# Patient Record
Sex: MALE | Race: WHITE | NOT HISPANIC OR LATINO | URBAN - NONMETROPOLITAN AREA
[De-identification: names, ages, dates, MRNs, and addresses within clinical notes are randomized per-mention and may not be internally consistent; named-entity substitution may affect disease eponyms.]

---

## 2018-02-19 DIAGNOSIS — G47.00 INSOMNIA, UNSPECIFIED TYPE: ICD-10-CM

## 2018-02-22 DIAGNOSIS — G47.00 INSOMNIA, UNSPECIFIED TYPE: ICD-10-CM

## 2018-02-22 RX ORDER — ZOLPIDEM TARTRATE 10 MG/1
TABLET ORAL
Qty: 90 TABLET | Refills: 0 | Status: SHIPPED | OUTPATIENT
Start: 2018-02-22 | End: 2018-02-22 | Stop reason: SDUPTHER

## 2018-02-23 RX ORDER — ZOLPIDEM TARTRATE 10 MG/1
TABLET ORAL
Qty: 90 TABLET | Refills: 0 | Status: SHIPPED | OUTPATIENT
Start: 2018-02-23 | End: 2018-05-16 | Stop reason: SDUPTHER

## 2018-02-23 RX ORDER — ZOLPIDEM TARTRATE 10 MG/1
TABLET ORAL
OUTPATIENT
Start: 2018-02-23

## 2018-03-06 DIAGNOSIS — L71.9 ROSACEA: Primary | ICD-10-CM

## 2018-03-06 RX ORDER — AZELAIC ACID 0.15 G/G
1 GEL TOPICAL DAILY
Qty: 50 G | Refills: 2 | Status: SHIPPED | OUTPATIENT
Start: 2018-03-06 | End: 2019-03-06

## 2018-05-10 ENCOUNTER — OFFICE VISIT (OUTPATIENT)
Dept: FAMILY MEDICINE | Facility: CLINIC | Age: 78
End: 2018-05-10
Payer: COMMERCIAL

## 2018-05-10 VITALS
BODY MASS INDEX: 29.44 KG/M2 | SYSTOLIC BLOOD PRESSURE: 142 MMHG | TEMPERATURE: 99 F | WEIGHT: 188 LBS | RESPIRATION RATE: 18 BRPM | DIASTOLIC BLOOD PRESSURE: 80 MMHG | HEART RATE: 83 BPM | OXYGEN SATURATION: 93 %

## 2018-05-10 DIAGNOSIS — J40 BRONCHITIS: Primary | ICD-10-CM

## 2018-05-10 PROBLEM — N40.0 BENIGN PROSTATIC HYPERPLASIA: Status: ACTIVE | Noted: 2018-05-10

## 2018-05-10 PROBLEM — J84.10: Status: ACTIVE | Noted: 2018-05-10

## 2018-05-10 PROCEDURE — 99213 OFFICE O/P EST LOW 20 MIN: CPT | Mod: PO | Performed by: FAMILY MEDICINE

## 2018-05-10 PROCEDURE — 99213 OFFICE O/P EST LOW 20 MIN: CPT | Performed by: FAMILY MEDICINE

## 2018-05-10 RX ORDER — AZITHROMYCIN 250 MG/1
TABLET, FILM COATED ORAL
Qty: 6 TABLET | Refills: 0 | Status: SHIPPED | OUTPATIENT
Start: 2018-05-10 | End: 2018-05-16 | Stop reason: WASHOUT

## 2018-05-10 RX ORDER — PREDNISONE 10 MG/1
TABLET ORAL
Qty: 20 TABLET | Refills: 0 | Status: SHIPPED | OUTPATIENT
Start: 2018-05-10 | End: 2018-05-16 | Stop reason: WASHOUT

## 2018-05-10 ASSESSMENT — ENCOUNTER SYMPTOMS
CHEST TIGHTNESS: 0
ABDOMINAL PAIN: 0
HEMATURIA: 0
WHEEZING: 0
CHILLS: 0
DYSURIA: 0
TROUBLE SWALLOWING: 0
DIAPHORESIS: 0
DIARRHEA: 0
DIZZINESS: 0
DYSPHORIC MOOD: 0
WEAKNESS: 0
SLEEP DISTURBANCE: 0
COUGH: 0
SHORTNESS OF BREATH: 0
MYALGIAS: 0
BRUISES/BLEEDS EASILY: 1
FATIGUE: 0
CONSTIPATION: 0
EYE DISCHARGE: 0
UNEXPECTED WEIGHT CHANGE: 0
POLYDIPSIA: 0
FREQUENCY: 1
SINUS PRESSURE: 0
SORE THROAT: 0
NUMBNESS: 0
BACK PAIN: 1
FEVER: 0
HEADACHES: 0
BLOOD IN STOOL: 0
ADENOPATHY: 0
EYE PAIN: 0
EYE REDNESS: 0
DIFFICULTY URINATING: 0
NERVOUS/ANXIOUS: 0
VOICE CHANGE: 0
VOMITING: 0
RHINORRHEA: 0
NAUSEA: 0
ARTHRALGIAS: 0
JOINT SWELLING: 0
PALPITATIONS: 0
APPETITE CHANGE: 0
WOUND: 0

## 2018-05-10 ASSESSMENT — PAIN SCALES - GENERAL: PAINLEVEL: 4

## 2018-05-10 NOTE — PROGRESS NOTES
Subjective      Manuel Lazcano is a 77 y.o. male who presents for Cough (Heavy congestion onset of symptoms started 3 weeks ago.) and Shortness of Breath (Normally walks 2-4 miles/day. Unable )      Patient presents for cough, shortness of breath, and congestion for 3 weeks. Cough is occasionally productive. Patient states that he came back from [place] and states there was a lot of high winds picking up allergens and dust causing him sinus congestion/drainage which then moved into his chest. Patient states he typically walks 2-5 miles per day but since he has become sick he is unable to due to his shortness of breath. Denies fevers or chills but his wife did notice he was having some night sweats lately. Cough worsens when he lays down. He denies any PND or orthopnea.    Patient has previously had pulmonary testing done in [place] with concerns of interstitial pulmonary fibrosis. He received a 2nd opinion that stated he did not Have pulmonary fibrosis.  He has not had any significant issue since then.. No further concerns.           Allergies   Allergen Reactions   • Penicillins Hives     HIVES       Review of Systems   Constitutional: Negative for appetite change, chills, diaphoresis, fatigue, fever and unexpected weight change.   HENT: Negative for congestion, dental problem, ear discharge, ear pain, hearing loss, mouth sores, nosebleeds, rhinorrhea, sinus pressure, sore throat, tinnitus, trouble swallowing and voice change.    Eyes: Negative for pain, discharge, redness and visual disturbance.   Respiratory: Negative for cough, chest tightness, shortness of breath and wheezing.    Cardiovascular: Negative for chest pain, palpitations and leg swelling.   Gastrointestinal: Negative for abdominal pain, blood in stool, constipation, diarrhea, nausea and vomiting.   Endocrine: Negative for cold intolerance, heat intolerance, polydipsia and polyuria.   Genitourinary: Positive for frequency. Negative for  difficulty urinating, dysuria and hematuria.   Musculoskeletal: Positive for back pain. Negative for arthralgias, joint swelling and myalgias.   Skin: Negative for rash and wound.   Allergic/Immunologic: Positive for environmental allergies.   Neurological: Negative for dizziness, weakness, numbness and headaches.   Hematological: Negative for adenopathy. Bruises/bleeds easily.   Psychiatric/Behavioral: Negative for behavioral problems, dysphoric mood and sleep disturbance. The patient is not nervous/anxious.        Objective     Vitals  /80 (BP Location: Right arm, Patient Position: Sitting, Cuff Size: Reg)   Pulse 83   Temp 37.2 °C (99 °F) (Temporal)   Resp 18   Wt 85.3 kg (188 lb)   SpO2 93%   BMI 29.44 kg/m²     Physical Exam   Constitutional: He appears well-developed and well-nourished. No distress.   HENT:   Right Ear: External ear normal.   Left Ear: External ear normal.   Nose: Nose normal.   Mouth/Throat: Posterior oropharyngeal erythema present. No oropharyngeal exudate.   Eyes: Conjunctivae are normal. Right eye exhibits no discharge. Left eye exhibits no discharge. No scleral icterus.   Neck: Neck supple.   Cardiovascular: Normal rate, regular rhythm and normal heart sounds.  Exam reveals no gallop and no friction rub.    No murmur heard.  Pulmonary/Chest: Effort normal. No respiratory distress. He has no wheezes. He has no rales.   Diffuse rhonchi.    Musculoskeletal: He exhibits no edema.   Lymphadenopathy:     He has no cervical adenopathy.   Skin: Skin is warm and dry. No rash noted. He is not diaphoretic.   Nursing note and vitals reviewed.      Assessment/Plan   Diagnoses and all orders for this visit:    Bronchitis  Comments:  Start course of azithromycin and prednisone. Return precautions given, f/u if symptoms persist or worsen.  Orders:  -     azithromycin (ZITHROMAX) 250 mg tablet; Take 2 tablets the first day, then 1 tablet daily for 4 days.  -     predniSONE (DELTASONE) 10 mg  tablet; Take 4 tablets by mouth daily for 5 days        Return if symptoms worsen or fail to improve.    JORDY CABALLERO MD

## 2018-05-11 PROBLEM — M10.9 GOUT: Status: ACTIVE | Noted: 2018-05-11

## 2018-05-15 ENCOUNTER — TELEPHONE (OUTPATIENT)
Dept: FAMILY MEDICINE | Facility: CLINIC | Age: 78
End: 2018-05-15

## 2018-05-15 NOTE — TELEPHONE ENCOUNTER
If he is still not feeling well would recommend he come back and see us  sometime this week.  Thanks

## 2018-05-15 NOTE — TELEPHONE ENCOUNTER
Called pt he finished antibiotic and prednisone yesterday ,feels he is 20-30% better , chest still feels tight and has productive cough unknown color. He is out walking at this time , once he gets back home he will have walked about 1 mile. Did not sound winded on the phone. No temp. Appetite good

## 2018-05-15 NOTE — TELEPHONE ENCOUNTER
Called pt with recommendations per Dr. Rowe, transferred to scheduling, for an appointment this week , pt agreed

## 2018-05-16 ENCOUNTER — OFFICE VISIT (OUTPATIENT)
Dept: FAMILY MEDICINE | Facility: CLINIC | Age: 78
End: 2018-05-16
Payer: COMMERCIAL

## 2018-05-16 VITALS
OXYGEN SATURATION: 94 % | RESPIRATION RATE: 20 BRPM | DIASTOLIC BLOOD PRESSURE: 72 MMHG | BODY MASS INDEX: 29.29 KG/M2 | HEART RATE: 94 BPM | TEMPERATURE: 98.3 F | SYSTOLIC BLOOD PRESSURE: 130 MMHG | WEIGHT: 187 LBS

## 2018-05-16 DIAGNOSIS — G47.00 INSOMNIA, UNSPECIFIED TYPE: ICD-10-CM

## 2018-05-16 DIAGNOSIS — J40 BRONCHITIS: ICD-10-CM

## 2018-05-16 PROCEDURE — 99213 OFFICE O/P EST LOW 20 MIN: CPT | Performed by: FAMILY MEDICINE

## 2018-05-16 PROCEDURE — 99213 OFFICE O/P EST LOW 20 MIN: CPT | Mod: PO | Performed by: FAMILY MEDICINE

## 2018-05-16 RX ORDER — PREDNISONE 10 MG/1
TABLET ORAL
Qty: 20 TABLET | Refills: 0 | Status: SHIPPED | OUTPATIENT
Start: 2018-05-16 | End: 2024-09-07 | Stop reason: ALTCHOICE

## 2018-05-16 RX ORDER — ZOLPIDEM TARTRATE 10 MG/1
10 TABLET ORAL NIGHTLY
Qty: 90 TABLET | Refills: 3 | Status: SHIPPED | OUTPATIENT
Start: 2018-05-16 | End: 2018-05-18 | Stop reason: SDUPTHER

## 2018-05-16 ASSESSMENT — PAIN SCALES - GENERAL: PAINLEVEL: 0-NO PAIN

## 2018-05-16 NOTE — PROGRESS NOTES
Subjective      Manuel Lazcano is a 77 y.o. male who presents for Cough (Still present.) and Shortness of Breath (Improved but not 100% yet. )      Patient presents for follow up regarding bronchitis. He is improving but is still unable to take a full deep breath without coughing. Cough continues to be occasionally productive with white sputum, sore throat due to cough, and some tightness in chest. Denies fevers, chills, swelling in legs. He has finished courses of azithromycin and prednisone. He was able to walk 1 mile yesterday but had to rest on a bench towards the end to catch his breath.      Patient states he had a similar bronchitis episode such as this 2 years ago that had to be treated with 2 rounds of antibiotics.     Patient is requesting a refill on zolpidem. He uses this nightly so he is able to fall back to sleep after having to get up to use the restroom due to hx of benign prostatic hyperplasia. Patient is aware of risks of this medication, especially in elderly.  He states his sleep is very affected if he is not on the medication and is willing to accept risks.          Allergies   Allergen Reactions   • Penicillins Hives     HIVES       Review of Systems    Objective     Vitals  /72 (BP Location: Left arm, Patient Position: Sitting, Cuff Size: Reg)   Pulse 94   Temp 36.8 °C (98.3 °F) (Temporal)   Resp 20   Wt 84.8 kg (187 lb)   SpO2 94%   BMI 29.29 kg/m²     Physical Exam   Constitutional: He appears well-developed and well-nourished. No distress.   HENT:   Right Ear: External ear normal.   Left Ear: External ear normal.   Nose: Nose normal.   Mouth/Throat: Posterior oropharyngeal erythema present. No oropharyngeal exudate.   Eyes: Conjunctivae are normal. Right eye exhibits no discharge. Left eye exhibits no discharge. No scleral icterus.   Neck: Neck supple.   Cardiovascular: Normal rate, regular rhythm and normal heart sounds.  Exam reveals no gallop and no friction rub.    No  murmur heard.  Pulmonary/Chest: Effort normal. No respiratory distress. He has no wheezes. He has no rales.   Diffuse mild rhonchi.   Musculoskeletal: He exhibits no edema.   Lymphadenopathy:     He has no cervical adenopathy.   Skin: Skin is warm and dry. No rash noted. He is not diaphoretic.   Nursing note and vitals reviewed.      Assessment/Plan   Diagnoses and all orders for this visit:    Bronchitis  Comments:  Improving. Continue course of prednisone for 5 more days. F/u by phone call next week as needed.Return precautions given.  Orders:  -     predniSONE (DELTASONE) 10 mg tablet; Take 4 tablets by mouth daily for 5 days    Insomnia, unspecified type  Comments:  Refill Ambien.  Potential risks discussed.  Patient will follow-up as needed.  Orders:  -     zolpidem (AMBIEN) 10 mg tablet; Take 1 tablet (10 mg total) by mouth nightly.        Return if symptoms worsen or fail to improve.    JORDY CABALLERO MD

## 2018-05-17 DIAGNOSIS — J30.9 ALLERGIC RHINITIS, UNSPECIFIED CHRONICITY, UNSPECIFIED SEASONALITY, UNSPECIFIED TRIGGER: Primary | ICD-10-CM

## 2018-05-18 ENCOUNTER — TELEPHONE (OUTPATIENT)
Dept: FAMILY MEDICINE | Facility: CLINIC | Age: 78
End: 2018-05-18

## 2018-05-18 DIAGNOSIS — G47.00 INSOMNIA, UNSPECIFIED TYPE: ICD-10-CM

## 2018-05-18 RX ORDER — MONTELUKAST SODIUM 10 MG/1
TABLET ORAL
Qty: 90 TABLET | Refills: 2 | Status: SHIPPED | OUTPATIENT
Start: 2018-05-18 | End: 2018-12-02 | Stop reason: SDUPTHER

## 2018-05-18 RX ORDER — ZOLPIDEM TARTRATE 10 MG/1
10 TABLET ORAL NIGHTLY
Qty: 90 TABLET | Refills: 1 | OUTPATIENT
Start: 2018-05-18 | End: 2018-11-23 | Stop reason: SDUPTHER

## 2018-05-18 NOTE — TELEPHONE ENCOUNTER
"Cancelled rx zolpidem that was sent to [facility] in [facility] in [place], [place] on 5/16/18.    Per office notes from visit on 5/16/18, \"Patient is requesting a refill on zolpidem. He uses this nightly so he is able to fall back to sleep after having to get up to use the restroom due to hx of benign prostatic hyperplasia. Patient is aware of risks of this medication, especially in elderly.  He states his sleep is very affected if he is not on the medication and is willing to accept risks.\"    Rx was called to [facility] in [facility] in [place]; left on prescriber voicemail.      "

## 2018-05-21 ENCOUNTER — OFFICE VISIT (OUTPATIENT)
Dept: DERMATOLOGY | Facility: CLINIC | Age: 78
End: 2018-05-21
Attending: DERMATOLOGY
Payer: COMMERCIAL

## 2018-05-21 VITALS
HEIGHT: 67 IN | SYSTOLIC BLOOD PRESSURE: 153 MMHG | DIASTOLIC BLOOD PRESSURE: 73 MMHG | WEIGHT: 186.95 LBS | BODY MASS INDEX: 29.34 KG/M2

## 2018-05-21 DIAGNOSIS — D48.9 NEOPLASM OF UNCERTAIN BEHAVIOR: ICD-10-CM

## 2018-05-21 DIAGNOSIS — L82.0 INFLAMED SEBORRHEIC KERATOSIS: ICD-10-CM

## 2018-05-21 DIAGNOSIS — L90.5 SCAR OF SKIN: ICD-10-CM

## 2018-05-21 DIAGNOSIS — L57.0 KERATOSIS, ACTINIC: Primary | ICD-10-CM

## 2018-05-21 PROCEDURE — 99213 OFFICE O/P EST LOW 20 MIN: CPT | Mod: 25 | Performed by: DERMATOLOGY

## 2018-05-21 PROCEDURE — 17004 DESTROY PREMAL LESIONS 15/>: CPT | Mod: PO,51 | Performed by: DERMATOLOGY

## 2018-05-21 PROCEDURE — 99212 OFFICE O/P EST SF 10 MIN: CPT | Mod: PO | Performed by: DERMATOLOGY

## 2018-05-21 PROCEDURE — 17110 DESTRUCTION B9 LES UP TO 14: CPT | Mod: 59 | Performed by: DERMATOLOGY

## 2018-05-21 ASSESSMENT — PAIN SCALES - GENERAL: PAINLEVEL: 0-NO PAIN

## 2018-05-21 NOTE — PROGRESS NOTES
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    Destruction of Lesion  Date/Time: 5/21/2018 11:15 AM  Performed by: JET ALMANZAR      Consent  Verbal consent was obtained from the patient. Written consent was not obtained from the patient. Risks include permanent scarring, light or dark discoloration, infection, pain, bleeding, bruising, redness, blister formation and recurrence of the lesion. Consent given by patient. The patient states understanding of the procedure being performed. Patient ID confirmed verbally with patient and provided demographic data.     Location:  23 total lesions removed from head/neck.  Head/neck location(s): R cheek, L cheek, scalp, L ear and R ear  Number of head/neck lesions removed: 23    Procedure Details   The area was prepped and draped in a sterile fashion. Local anesthesia was not used. Lesion(s) are pre-malignant. Lesion(s) destroyed with: liquid nitrogen. Number of freeze/thaw cycles was 1.     Post-Procedure  Antibiotic ointment and sterile dressing was applied. Patient tolerated procedure well with no complications.       Destruction of Lesion  Date/Time: 5/21/2018 11:19 AM  Performed by: JET ALMANZAR      Consent  Verbal consent was obtained from the patient. Written consent was not obtained from the patient. Risks include permanent scarring, light or dark discoloration, infection, pain, bleeding, bruising, redness, blister formation and recurrence of the lesion. Consent given by patient. The patient states understanding of the procedure being performed. Patient ID confirmed verbally with patient and provided demographic data.     Location:  1 total lesions removed from trunk.  Trunk location(s): back  Number of Trunk lesions removed:1    Procedure Details   The area was prepped and draped in a sterile fashion. Local anesthesia was not  used. Lesion(s) are benign. Lesion(s) destroyed with: liquid nitrogen. Lesion(s) were frozen until an ice ball extended beyond the lesion.     Post-Procedure  Patient tolerated procedure well with no complications.

## 2018-05-21 NOTE — PROGRESS NOTES
HPI     The patient presents today for his annual skin check.  He has new lesions of concern on the face ears and scalp.  He recently underwent Mohs micrographic surgery to the right frontal scalp.  This is well-healed and he has no complaints at present.        Patient problems have been reviewed and documented as below:  Patient Active Problem List   Diagnosis   • Benign prostatic hyperplasia   • Gout       Patient's social and family history have been reviewed and documented as below:  Social History     Social History   • Marital status:      Spouse name: N/A   • Number of children: N/A   • Years of education: N/A     Occupational History   • Not on file.     Social History Main Topics   • Smoking status: Never Smoker   • Smokeless tobacco: Never Used   • Alcohol use Not on file      Comment: Wine   • Drug use: Unknown   • Sexual activity: Not on file     Other Topics Concern   • Not on file     Social History Narrative   • No narrative on file     History reviewed. No pertinent family history.    Pertinent positive review of systems are listed below and are otherwise reviewed as negative from HPI:  Review of Systems   All other systems reviewed and are negative.      All allergies have been reviewed and documented as below:  Allergies   Allergen Reactions   • Penicillins Hives     HIVES       All medications have been reviewed and documented as currently taking as below:    Current Outpatient Prescriptions:   •  azelaic acid (FINACEA) 15 % cream, Apply 1 application topically daily. After skin is washed and patted dry, gently massage a thin film of cream into the affected areas daily., Disp: 50 g, Rfl: 2  •  cholecalciferol, vitamin D3, (VITAMIN D3) 2,000 unit tablet, 1 tab Daily po, Disp: , Rfl:   •  ipratropium (ATROVENT) 0.06 % nasal spray, PRN, Disp: 135, Rfl: 0  •  lansoprazole (PREVACID) 30 mg capsule, TAKE 1 CAPSULE TWICE DAILY, Disp: 120, Rfl: 0  •  mometasone (NASONEX) 50 mcg/actuation nasal  "spray, Use 2 sprays nasally twice a day, Disp: 3, Rfl: 12  •  montelukast (SINGULAIR) 10 mg tablet, TAKE 1 TABLET BY MOUTH  DAILY, Disp: 90 tablet, Rfl: 2  •  olopatadine (PATANOL) 0.1 % ophthalmic solution, , Disp: 15, Rfl: 0  •  polyethylene glycol (MIRALAX) 17 gram packet, Take 1 packet as needed by oral route., Disp: , Rfl:   •  predniSONE (DELTASONE) 10 mg tablet, Take 4 tablets by mouth daily for 5 days, Disp: 20 tablet, Rfl: 0  •  probenecid (BENEMID) 500 mg tablet, , Disp: 90, Rfl: 0  •  tamsulosin (FLOMAX) 0.4 mg capsule,extended release 24hr, , Disp: 90, Rfl: 0  •  zolpidem (AMBIEN) 10 mg tablet, Take 1 tablet (10 mg total) by mouth nightly., Disp: 90 tablet, Rfl: 1    Objective     Vital signs have been reviewed and documented as below:  Vitals:    05/21/18 1047   BP: 153/73   BP Location: Right arm   Patient Position: Sitting   Cuff Size: Regular   Weight: 84.8 kg (186 lb 15.2 oz)   Height: 1.702 m (5' 7.01\")       Physical Exam:  ----------------------  Constitutional: General Appearance: healthy-appearing, well-nourished, and well-developed. Level of Distress: NAD. Ambulation: ambulating normally.  Psychiatric: Insight: good judgement. Mental Status: normal mood and affect and active and alert. Orientation: to time, place, and person. Memory: recent memory normal and remote memory normal.  Head: normocephalic and atraumatic.  Eyes: Lids and Conjunctivae: no discharge or pallor and non-injected. Lens: clear. Sclerae: non-icteric.  Neurologic: Gait and Station: normal gait and station. Cranial Nerves: grossly intact. Coordination and Cerebellum: no tremor.  Skin: a cutaneous examination was performed including: Bilateral upper extremities head and neck.  And were found to be within normal limits with the exceptions of the findings listed below:  Skin: Papules on face ears and scalp.  There is a well-healed surgical scar in the right frontal scalp with no evidence of recurrence.  Scaly brown papule on " left lateral neck    Procedures   Procedures  Derm Physical Exam    Assessment and Plan   1. Keratosis, actinic  Cryosurgery performed in the office today.  Photoprotection measures reinforced.  Recommend annual follow-up.  - Destruction of lesion    2.  History of nonmelanoma skin cancer  --There is no clinical evidence of recurrent nonmelanoma skin cancer on examination today.  -Recommend continued self exams  -Continued photoprotection measures reinforced  -Recommend annual follow-up for full skin examinations      3. Inflamed seborrheic keratosis  Cryosurgery performed to a single lesion on the left neck/shoulder today.

## 2018-09-07 DIAGNOSIS — K21.9 GASTROESOPHAGEAL REFLUX DISEASE, ESOPHAGITIS PRESENCE NOT SPECIFIED: ICD-10-CM

## 2018-09-07 DIAGNOSIS — N40.0 BENIGN PROSTATIC HYPERPLASIA, UNSPECIFIED WHETHER LOWER URINARY TRACT SYMPTOMS PRESENT: ICD-10-CM

## 2018-09-07 DIAGNOSIS — M10.9 GOUT, UNSPECIFIED CAUSE, UNSPECIFIED CHRONICITY, UNSPECIFIED SITE: Primary | ICD-10-CM

## 2018-09-12 RX ORDER — LANSOPRAZOLE 30 MG/1
CAPSULE, DELAYED RELEASE ORAL
Qty: 180 CAPSULE | Refills: 3 | Status: SHIPPED | OUTPATIENT
Start: 2018-09-12

## 2018-09-12 RX ORDER — TAMSULOSIN HYDROCHLORIDE 0.4 MG/1
CAPSULE ORAL
Qty: 90 CAPSULE | Refills: 3 | Status: SHIPPED | OUTPATIENT
Start: 2018-09-12

## 2018-09-12 RX ORDER — PROBENECID 500 MG/1
TABLET, FILM COATED ORAL
Qty: 90 TABLET | Refills: 3 | Status: SHIPPED | OUTPATIENT
Start: 2018-09-12

## 2018-11-13 DIAGNOSIS — G47.00 INSOMNIA, UNSPECIFIED TYPE: ICD-10-CM

## 2018-11-16 ENCOUNTER — OFFICE VISIT (OUTPATIENT)
Dept: URBAN - METROPOLITAN AREA CLINIC 71 | Facility: CLINIC | Age: 78
End: 2018-11-16
Payer: MEDICARE

## 2018-11-16 PROCEDURE — 92002 INTRM OPH EXAM NEW PATIENT: CPT | Performed by: OPTOMETRIST

## 2018-11-16 ASSESSMENT — INTRAOCULAR PRESSURE
OD: 11
OS: 11

## 2018-11-16 NOTE — IMPRESSION/PLAN
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 OS worse than OD. Plan: Genteal Gel severe QHS OU. Sleeping mask. Mod-sev QID OU in day. Blink more thoroughly and more frequently.

## 2018-11-16 NOTE — IMPRESSION/PLAN
Impression: Diagnosis: Presence of intraocular lens. Code: Z96.1. s/p Restore OU. Dilated in SD June 2018.  Plan: rtc yearly dfe

## 2018-11-20 RX ORDER — ZOLPIDEM TARTRATE 10 MG/1
10 TABLET ORAL NIGHTLY
OUTPATIENT
Start: 2018-11-20

## 2018-11-21 ENCOUNTER — TELEPHONE (OUTPATIENT)
Dept: FAMILY MEDICINE | Facility: CLINIC | Age: 78
End: 2018-11-21

## 2018-11-21 NOTE — TELEPHONE ENCOUNTER
Call and spoke with Manuel and they are now in [place] and will not be back till @ May. Was in on 5/18/2018 .     Are you willing to refill?    Thank you

## 2018-11-23 ENCOUNTER — TELEPHONE (OUTPATIENT)
Dept: FAMILY MEDICINE | Facility: CLINIC | Age: 78
End: 2018-11-23

## 2018-11-23 DIAGNOSIS — G47.00 INSOMNIA, UNSPECIFIED TYPE: ICD-10-CM

## 2018-11-23 RX ORDER — ZOLPIDEM TARTRATE 10 MG/1
10 TABLET ORAL NIGHTLY
Qty: 90 TABLET | Refills: 0 | OUTPATIENT
Start: 2018-11-23

## 2018-11-23 NOTE — TELEPHONE ENCOUNTER
Talked with Dr.Jason Senior as  is out of office.  Zolpidem 10 mg #90 one tablet at bedtime with no refills called to [facility] at [facility] in [place], [place] at [phone] at 0940.  Dr.Jason Senior KHANG # [id number]. Patient notifed and verbalizes understanding.

## 2018-11-23 NOTE — TELEPHONE ENCOUNTER
Dr.Jason Senior ordered Zolpidem 10 mg #90 one table at bedtime with no refills.  Patient has made an appointment with provider in [place] but it is not until 02/2019.  Advised patient he will need to make an appointment with another provider before the next refills is due.  Patient verbalizes understanding.

## 2018-11-27 ENCOUNTER — TELEPHONE (OUTPATIENT)
Dept: FAMILY MEDICINE | Facility: CLINIC | Age: 78
End: 2018-11-27

## 2018-12-02 DIAGNOSIS — J30.9 ALLERGIC RHINITIS: ICD-10-CM

## 2018-12-05 DIAGNOSIS — J30.9 ALLERGIC RHINITIS: ICD-10-CM

## 2018-12-05 RX ORDER — MONTELUKAST SODIUM 10 MG/1
TABLET ORAL
Qty: 90 TABLET | Refills: 1 | Status: SHIPPED | OUTPATIENT
Start: 2018-12-05

## 2018-12-05 RX ORDER — MONTELUKAST SODIUM 10 MG/1
10 TABLET ORAL
OUTPATIENT
Start: 2018-12-05

## 2018-12-17 ENCOUNTER — OFFICE VISIT (OUTPATIENT)
Dept: URBAN - METROPOLITAN AREA CLINIC 71 | Facility: CLINIC | Age: 78
End: 2018-12-17
Payer: MEDICARE

## 2018-12-17 PROCEDURE — 99213 OFFICE O/P EST LOW 20 MIN: CPT | Performed by: OPTOMETRIST

## 2018-12-17 RX ORDER — NEOMYCIN SULFATE, POLYMYXIN B SULFATE AND DEXAMETHASONE 3.5; 10000; 1 MG/ML; [USP'U]/ML; MG/ML
SUSPENSION OPHTHALMIC
Qty: 1 | Refills: 0 | Status: INACTIVE
Start: 2018-12-17 | End: 2018-12-17

## 2018-12-17 ASSESSMENT — INTRAOCULAR PRESSURE
OD: 15
OS: 13

## 2018-12-17 NOTE — IMPRESSION/PLAN
Impression: Exposure keratoconjunctivitis, bilateral: D72.939. Much improved OU on Genteal Gel Severe QHS and Systane drops during day and sleeping mask. Pt reports last DE 5/2018 in [place]. Plan: Continue Genteal Gel severe QHS OU. Sleeping mask. Systane QID OU in day. Blink more thoroughly and more frequently. DE here 5/2019.

## 2019-05-17 ENCOUNTER — OFFICE VISIT (OUTPATIENT)
Dept: URBAN - METROPOLITAN AREA CLINIC 71 | Facility: CLINIC | Age: 79
End: 2019-05-17
Payer: MEDICARE

## 2019-05-17 DIAGNOSIS — H16.213 EXPOSURE KERATOCONJUNCTIVITIS, BILATERAL: Primary | ICD-10-CM

## 2019-05-17 PROCEDURE — 99214 OFFICE O/P EST MOD 30 MIN: CPT | Performed by: OPTOMETRIST

## 2019-05-17 ASSESSMENT — INTRAOCULAR PRESSURE
OD: 13
OS: 13

## 2019-05-17 NOTE — IMPRESSION/PLAN
Impression: Diagnosis: Presence of intraocular lens. Code: Z96.1. s/p Restore OU.  Plan: rtc yearly dfe

## 2019-05-17 NOTE — IMPRESSION/PLAN
Impression: Exposure keratoconjunctivitis, bilateral: N16.295. Pt using drops in day. Has difficulty with Genteal Severe. Occasionally uses sleep mask. Mild allergy component. Plan: Continue Genteal GEL severe QHS OU. Sleeping mask. Systane QID OU in day. Blink more thoroughly and more frequently.  Gave ketotifen list. Gave info on TranquilEyes by [employer].

## 2021-03-01 ENCOUNTER — OFFICE VISIT (OUTPATIENT)
Dept: URBAN - METROPOLITAN AREA CLINIC 71 | Facility: CLINIC | Age: 81
End: 2021-03-01
Payer: MEDICARE

## 2021-03-01 DIAGNOSIS — H43.813 VITREOUS DEGENERATION, BILATERAL: ICD-10-CM

## 2021-03-01 PROCEDURE — 92014 COMPRE OPH EXAM EST PT 1/>: CPT | Performed by: OPTOMETRIST

## 2021-03-01 ASSESSMENT — INTRAOCULAR PRESSURE
OD: 14
OS: 15

## 2021-03-01 NOTE — IMPRESSION/PLAN
Impression: Vitreous degeneration, bilateral: H43.813.  PVD OU Plan: Continue to monitor with yearly DE

## 2021-03-01 NOTE — IMPRESSION/PLAN
Impression: Diagnosis: Presence of intraocular lens. Code: Z96.1. s/p Restore OU.  Plan: Continue to observe

## 2021-03-01 NOTE — IMPRESSION/PLAN
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 Recommended lubricating ointment QHS OU, as well as possible Tea Tree oil cleanser if needed. Call if new or worsening symptoms.

## 2021-03-01 NOTE — IMPRESSION/PLAN
Impression: Exposure keratoconjunctivitis, bilateral: V87.438. Pt using drops in day. Has difficulty with Genteal Severe. Occasionally uses sleep mask, but has difficulties keeping them on at night. Mild allergy component. Plan: Continue Genteal GEL severe QHS OU, or lubricating ointment spread across the eyelashes. Instructed in how to apply with clean fingers. Sleeping mask. Systane QID OU in day. Blink more thoroughly and more frequently.  Gave ketotifen list.

## 2021-07-23 ENCOUNTER — OFFICE VISIT (OUTPATIENT)
Dept: URBAN - METROPOLITAN AREA CLINIC 71 | Facility: CLINIC | Age: 81
End: 2021-07-23
Payer: MEDICARE

## 2021-07-23 DIAGNOSIS — B88.0 DERMATITIS DUE TO DEMODEX SPECIES: Primary | ICD-10-CM

## 2021-07-23 DIAGNOSIS — Z96.1 PRESENCE OF INTRAOCULAR LENS: ICD-10-CM

## 2021-07-23 DIAGNOSIS — H52.223 REGULAR ASTIGMATISM, BILATERAL: ICD-10-CM

## 2021-07-23 DIAGNOSIS — H16.223 KERATOCONJUNCTIVITIS SICCA, NOT SPECIFIED AS SJÖGREN'S, BILATERAL: ICD-10-CM

## 2021-07-23 PROCEDURE — 99214 OFFICE O/P EST MOD 30 MIN: CPT | Performed by: OPTOMETRIST

## 2021-07-23 ASSESSMENT — INTRAOCULAR PRESSURE
OD: 12
OS: 12

## 2021-07-23 NOTE — IMPRESSION/PLAN
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 Recommended lubricating ointment QHS OU, as well as Oust Demodex cleanser. Call if new or worsening symptoms.

## 2021-07-23 NOTE — IMPRESSION/PLAN
Impression: Diagnosis: Presence of intraocular lens. Code: Z96.1. s/p Restore OU. Plan: Continue to monitor.

## 2021-07-23 NOTE — IMPRESSION/PLAN
Impression: Regular astigmatism, bilateral: H52.223. Plan: A glasses Rx was not generated or given to pt today. Discussed pt returning for refraction for a glasses script to help with his vision complaints.   Okay to schedule refraction at patient's request.

## 2021-09-27 ENCOUNTER — OFFICE VISIT (OUTPATIENT)
Dept: URBAN - METROPOLITAN AREA CLINIC 71 | Facility: CLINIC | Age: 81
End: 2021-09-27
Payer: MEDICARE

## 2021-09-27 DIAGNOSIS — H52.4 PRESBYOPIA: Primary | ICD-10-CM

## 2021-09-27 PROCEDURE — 92012 INTRM OPH EXAM EST PATIENT: CPT | Performed by: OPTOMETRIST

## 2021-09-27 ASSESSMENT — VISUAL ACUITY
OS: 20/25
OD: 20/20

## 2021-09-27 ASSESSMENT — INTRAOCULAR PRESSURE
OS: 10
OD: 9

## 2021-09-27 NOTE — IMPRESSION/PLAN
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 Pt has not yet started the Oust Demodex. Plan: Discussed. Recommended lubricating ointment QHS OU, as well as Oust Demodex cleanser. Call if new or worsening symptoms.

## 2021-09-27 NOTE — IMPRESSION/PLAN
Impression: Presbyopia: H52.4. Bilateral. Plan: A glasses prescription has been discussed and generated. Adaptation period expected. Patient to call with any concerns.

## 2022-05-05 ENCOUNTER — OFFICE VISIT (OUTPATIENT)
Dept: URBAN - METROPOLITAN AREA CLINIC 30 | Facility: CLINIC | Age: 82
End: 2022-05-05
Payer: MEDICARE

## 2022-05-05 DIAGNOSIS — H16.223 KERATOCONJUNCTIVITIS SICCA, NOT SPECIFIED AS SJÖGREN'S, BILATERAL: Primary | ICD-10-CM

## 2022-05-05 DIAGNOSIS — B88.0 DERMATITIS DUE TO DEMODEX SPECIES: ICD-10-CM

## 2022-05-05 DIAGNOSIS — H43.813 VITREOUS DEGENERATION, BILATERAL: ICD-10-CM

## 2022-05-05 DIAGNOSIS — H43.393 OTHER VITREOUS OPACITIES, BILATERAL: ICD-10-CM

## 2022-05-05 DIAGNOSIS — L71.9 ROSACEA: ICD-10-CM

## 2022-05-05 DIAGNOSIS — H00.025 HORDEOLUM INTERNUM LEFT LOWER LID: ICD-10-CM

## 2022-05-05 DIAGNOSIS — H01.001 UNSPECIFIED BLEPARITIS OF RIGHT UPPER EYELID: ICD-10-CM

## 2022-05-05 PROCEDURE — 92134 CPTRZ OPH DX IMG PST SGM RTA: CPT | Performed by: OPTOMETRIST

## 2022-05-05 PROCEDURE — 83861 MICROFLUID ANALY TEARS: CPT | Performed by: OPTOMETRIST

## 2022-05-05 PROCEDURE — 99204 OFFICE O/P NEW MOD 45 MIN: CPT | Performed by: OPTOMETRIST

## 2022-05-05 RX ORDER — NEOMYCIN SULFATE, POLYMYXIN B SULFATE AND DEXAMETHASONE 3.5; 10000; 1 MG/G; [USP'U]/G; MG/G
OINTMENT OPHTHALMIC
Qty: 1 | Refills: 0 | Status: ACTIVE
Start: 2022-05-05

## 2022-05-05 ASSESSMENT — INTRAOCULAR PRESSURE
OD: 14
OS: 13

## 2022-05-05 ASSESSMENT — VISUAL ACUITY
OD: 20/40
OS: 20/30

## 2022-05-05 ASSESSMENT — KERATOMETRY: OS: 43.30

## 2022-05-05 NOTE — IMPRESSION/PLAN
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 Pt has not yet started the Oust Demodex. Plan: Previous hx. Cont lid hygiene.

## 2022-05-05 NOTE — IMPRESSION/PLAN
Impression: Other vitreous opacities, bilateral: H43.393. Plan: No evidence of RD or tear noted. All signs and risks of retinal detachment or tears were discussed in detail. If pt. notices any symptoms discussed, contact office ASAP. Recommend pt. return for normal recall. see mac oct.

## 2022-05-05 NOTE — IMPRESSION/PLAN
Impression: Keratoconjunctivitis sicca, bilateral: R41.043. Plan: AT's qid OU. Recommend O3's. Avoid ceiling fans.

## 2022-05-05 NOTE — IMPRESSION/PLAN
Impression: Hordeolum internum left lower lid: H00.025. Plan: Start WC's qhs OU and  Maxitrol jayda qhs OU- rx'd today- to the lids.

## 2023-02-23 ENCOUNTER — OFFICE VISIT (OUTPATIENT)
Dept: URBAN - METROPOLITAN AREA CLINIC 18 | Facility: CLINIC | Age: 83
End: 2023-02-23
Payer: MEDICARE

## 2023-02-23 DIAGNOSIS — H43.813 VITREOUS DEGENERATION, BILATERAL: Primary | ICD-10-CM

## 2023-02-23 DIAGNOSIS — H16.223 KERATOCONJUNCTIVITIS SICCA, NOT SPECIFIED AS SJÖGREN'S, BILATERAL: ICD-10-CM

## 2023-02-23 DIAGNOSIS — Z96.1 PRESENCE OF INTRAOCULAR LENS: ICD-10-CM

## 2023-02-23 DIAGNOSIS — H52.4 PRESBYOPIA: ICD-10-CM

## 2023-02-23 PROCEDURE — 99213 OFFICE O/P EST LOW 20 MIN: CPT | Performed by: OPTOMETRIST

## 2023-02-23 ASSESSMENT — VISUAL ACUITY
OS: 20/30
OD: 20/30

## 2023-02-23 ASSESSMENT — INTRAOCULAR PRESSURE
OD: 10
OS: 9

## 2023-02-23 NOTE — IMPRESSION/PLAN
Impression: Vitreous degeneration, bilateral: H43.813. Plan: Posterior vitreous detachment accounts for the patient's complaints. There is no evidence of retinal pathology. All signs and risks of retinal detachment and tears were discussed in detail. Patient instructed to call the office immediately if flashes and floaters increase dramatically.

## 2023-07-17 ENCOUNTER — OFFICE VISIT (OUTPATIENT)
Dept: URGENT CARE | Facility: CLINIC | Age: 83
End: 2023-07-17
Payer: COMMERCIAL

## 2023-07-17 VITALS
OXYGEN SATURATION: 98 % | TEMPERATURE: 97 F | WEIGHT: 173.4 LBS | SYSTOLIC BLOOD PRESSURE: 139 MMHG | HEART RATE: 44 BPM | DIASTOLIC BLOOD PRESSURE: 47 MMHG | BODY MASS INDEX: 27.15 KG/M2 | RESPIRATION RATE: 16 BRPM

## 2023-07-17 DIAGNOSIS — S51.812A SKIN TEAR OF LEFT FOREARM WITHOUT COMPLICATION, INITIAL ENCOUNTER: Primary | ICD-10-CM

## 2023-07-17 DIAGNOSIS — S51.811A SKIN TEAR OF RIGHT FOREARM WITHOUT COMPLICATION, INITIAL ENCOUNTER: ICD-10-CM

## 2023-07-17 PROCEDURE — 99212 OFFICE O/P EST SF 10 MIN: CPT

## 2023-07-17 PROCEDURE — G0463 HOSPITAL OUTPT CLINIC VISIT: HCPCS

## 2023-07-17 RX ORDER — CLOPIDOGREL BISULFATE 75 MG/1
75 TABLET ORAL DAILY
COMMUNITY

## 2023-07-17 ASSESSMENT — ENCOUNTER SYMPTOMS: WOUND: 1

## 2023-07-17 NOTE — PROGRESS NOTES
Subjective      Manuel Lazcano is a 82 y.o. male who presents for skin tears.    HPI    82 year old male presents for skin abrasions that happened yesterday.  [place] leaning against a golf cart.  He tripped and fell.  His arms brushed against the cement.  Took a top layer of skin off.  He went to [employer] and bought a wound care kit and has been using this.  He also reports that he has had a low heart rate and is not having any symptoms of it.  Denies shortness of breath, chest pain, dizziness, etc.    The following have been reviewed and updated as appropriate in this visit:          Allergies   Allergen Reactions    Penicillins Hives     HIVES     Current Outpatient Medications   Medication Sig Dispense Refill    clopidogreL (PLAVIX) 75 mg tablet Take 1 tablet (75 mg total) by mouth daily      montelukast (SINGULAIR) 10 mg tablet TAKE 1 TABLET BY MOUTH  DAILY 90 tablet 1    probenecid (BENEMID) 500 mg tablet TAKE 1 TABLET BY MOUTH  DAILY 90 tablet 3    lansoprazole (PREVACID) 30 mg capsule TAKE 1 CAPSULE BY MOUTH  TWICE DAILY 180 capsule 3    tamsulosin (FLOMAX) 0.4 mg capsule TAKE 1 CAPSULE BY MOUTH  DAILY 90 capsule 3    polyethylene glycol (MIRALAX) 17 gram packet Take 1 packet as needed by oral route.      zolpidem (AMBIEN) 10 mg tablet Take 1 tablet (10 mg total) by mouth nightly. (Patient not taking: Reported on 7/17/2023) 90 tablet 0    predniSONE (DELTASONE) 10 mg tablet Take 4 tablets by mouth daily for 5 days (Patient not taking: Reported on 7/17/2023) 20 tablet 0    mometasone (NASONEX) 50 mcg/actuation nasal spray Use 2 sprays nasally twice a day 3 12    ipratropium (ATROVENT) 0.06 % nasal spray  0    olopatadine (PATANOL) 0.1 % ophthalmic solution  15 0    cholecalciferol, vitamin D3, (VITAMIN D3) 2,000 unit tablet 1 tab Daily po       No current facility-administered medications for this visit.     Past Medical History:   Diagnosis Date    Allergic      GERD (gastroesophageal reflux disease)     Gout      Past Surgical History:   Procedure Laterality Date    NOSE SURGERY      TONSILLECTOMY       No family history on file.  Social History     Socioeconomic History    Marital status:    Tobacco Use    Smoking status: Never    Smokeless tobacco: Never     Social Determinants of Health     Tobacco Use: Low Risk  (7/17/2023)    Patient History     Smoking Tobacco Use: Never     Smokeless Tobacco Use: Never       Review of Systems   Skin:  Positive for wound.       Objective   /47 (BP Location: Right arm, Patient Position: Sitting, Cuff Size: Regular Adult)   Pulse (!) 44   Temp 36.1 °C (97 °F) (Temporal)   Resp 16   Wt 78.7 kg (173 lb 6.4 oz)   SpO2 98%   BMI 27.15 kg/m²     Physical Exam  Vitals and nursing note reviewed.   Constitutional:       Appearance: Normal appearance.   Cardiovascular:      Rate and Rhythm: Normal rate and regular rhythm.      Heart sounds: Normal heart sounds.   Pulmonary:      Effort: Pulmonary effort is normal.      Breath sounds: Normal breath sounds.   Skin:     Comments: 6 inch skin tear along left forearm that is bleeding.  1 cm skin tear on the right forearm.  Top level of skin is missing.  No fatty tissue exposed.   Neurological:      Mental Status: He is alert and oriented to person, place, and time.         Assessment/Plan   Diagnoses and all orders for this visit:    Skin tear of left forearm without complication, initial encounter    Skin tear of right forearm without complication, initial encounter    Wounds were cleansed with Hibiclens. Left forearm hemostasis with quick clot.  This dressing was removed and antibiotic and Telfa was applied.  Wrapped in Coban on the left arm.  Right arm was dressed with antibiotic ointment and Mepilex.  Encouraged him to change his dressings every 24 hours and to keep these covered while the skin heals.  Once the skin is closed he may leave this open to air.  He will also  monitor for infection.  He will return right away if signs of infection develop.  He declined Tdap.    He is bradycardic.  He reports he is not having any symptoms and this is his normal.  Since he is asyptomatic and this is a known problem for him, will defer further testing for his primary care provider.    Britni White, CNP

## 2023-07-17 NOTE — PATIENT INSTRUCTIONS
Keep the skin tears covered until closed    Be seen if this develops worsening redness, swelling or purulent drainage

## 2023-07-28 ENCOUNTER — TELEPHONE (OUTPATIENT)
Dept: WOUND CARE | Facility: HOSPITAL | Age: 83
End: 2023-07-28
Payer: COMMERCIAL

## 2023-07-28 NOTE — TELEPHONE ENCOUNTER
Called to schedule after receipt of referral for wound care. Patient has questions concerning treatment. Patient states returning to [place] on 08/31/2023 and has questions regarding method of  treatment.  Spoke to TRIP[facility] Aceves and Yola advised to tell the patient, that nursing staff would contact him next week. Advised patient of same. Best contact number: [phone].

## 2023-07-31 ENCOUNTER — CLINICAL SUPPORT (OUTPATIENT)
Dept: FAMILY MEDICINE | Facility: CLINIC | Age: 83
End: 2023-07-31
Payer: COMMERCIAL

## 2023-07-31 DIAGNOSIS — H61.23 BILATERAL HEARING LOSS DUE TO CERUMEN IMPACTION: Primary | ICD-10-CM

## 2023-07-31 PROCEDURE — 69209 REMOVE IMPACTED EAR WAX UNI: CPT | Mod: 50

## 2023-07-31 NOTE — PROGRESS NOTES
Nurse Cerumen Removal    Date/Time: 7/31/2023 11:41 AM    Performed by: Richelle Wu LPN    Consent    Verbal consent was obtained from the patient. Written consent was not obtained from the patient. Risks, benefits, and alternatives were discussed. Consent given by patient. The patient states understanding of the procedure being performed. Patient ID confirmed verbally with the patient.       Procedure Details   Cerumenolytics not applied prior to procedure Cerumen was removed from bilateral ears with irrigation of warm water.      Post-Procedure   All Cerumen returned, canal(s) clear and TM intact. Patient tolerated procedure well with no complications.      Procedure Comments  Patient voiced no complaints or concerns at this time.

## 2023-08-01 NOTE — TELEPHONE ENCOUNTER
Called and discussed this appointment with patient at length. Order for referral was placed for a cellulitis and skin tear wound of the left arm. Patient states this is resolved and not an issue at this time.     Patient was really wanting to have hyperbaric oxygen treatments for chronic headaches and fatigue. I advised that he would need the appropriate referral placed by his PCP and would need to contact his insurance to see if this was a covered service.     He states since he is going out of town in a couple weeks he would have all of this started down in [place]. He was grateful for the call and his upcoming appointment was cancelled.

## 2023-08-02 ENCOUNTER — TELEPHONE (OUTPATIENT)
Dept: WOUND CARE | Facility: HOSPITAL | Age: 83
End: 2023-08-02
Payer: COMMERCIAL

## 2023-11-06 ENCOUNTER — OFFICE VISIT (OUTPATIENT)
Dept: URBAN - METROPOLITAN AREA CLINIC 18 | Facility: CLINIC | Age: 83
End: 2023-11-06
Payer: MEDICARE

## 2023-11-06 DIAGNOSIS — H43.813 VITREOUS DEGENERATION, BILATERAL: ICD-10-CM

## 2023-11-06 DIAGNOSIS — H16.143 PUNCTATE KERATITIS, BILATERAL: Primary | ICD-10-CM

## 2023-11-06 PROCEDURE — 99213 OFFICE O/P EST LOW 20 MIN: CPT

## 2023-11-06 ASSESSMENT — INTRAOCULAR PRESSURE
OS: 8
OD: 9

## 2024-05-06 ENCOUNTER — OFFICE VISIT (OUTPATIENT)
Dept: URBAN - METROPOLITAN AREA CLINIC 18 | Facility: CLINIC | Age: 84
End: 2024-05-06
Payer: MEDICARE

## 2024-05-06 DIAGNOSIS — H52.4 PRESBYOPIA: ICD-10-CM

## 2024-05-06 DIAGNOSIS — H16.143 PUNCTATE KERATITIS, BILATERAL: Primary | ICD-10-CM

## 2024-05-06 PROCEDURE — 99213 OFFICE O/P EST LOW 20 MIN: CPT

## 2024-05-06 ASSESSMENT — INTRAOCULAR PRESSURE
OD: 9
OS: 8

## 2024-05-06 ASSESSMENT — VISUAL ACUITY
OS: 20/20
OD: 20/20

## 2024-09-07 ENCOUNTER — OFFICE VISIT (OUTPATIENT)
Dept: URGENT CARE | Facility: CLINIC | Age: 84
End: 2024-09-07
Payer: COMMERCIAL

## 2024-09-07 VITALS
SYSTOLIC BLOOD PRESSURE: 152 MMHG | TEMPERATURE: 97.7 F | BODY MASS INDEX: 27.4 KG/M2 | RESPIRATION RATE: 16 BRPM | DIASTOLIC BLOOD PRESSURE: 54 MMHG | WEIGHT: 175 LBS | HEART RATE: 61 BPM | OXYGEN SATURATION: 96 %

## 2024-09-07 DIAGNOSIS — L98.9 SKIN LESION: Primary | ICD-10-CM

## 2024-09-07 PROCEDURE — G0463 HOSPITAL OUTPT CLINIC VISIT: HCPCS

## 2024-09-07 PROCEDURE — 99213 OFFICE O/P EST LOW 20 MIN: CPT

## 2024-09-07 RX ORDER — LEVOTHYROXINE SODIUM 50 UG/1
TABLET ORAL
COMMUNITY

## 2024-09-07 RX ORDER — BETAMETHASONE DIPROPIONATE 0.5 MG/G
OINTMENT, AUGMENTED TOPICAL
COMMUNITY
Start: 2024-06-27

## 2024-09-07 RX ORDER — CEPHALEXIN 500 MG/1
500 CAPSULE ORAL 3 TIMES DAILY
Qty: 21 CAPSULE | Refills: 0 | Status: SHIPPED | OUTPATIENT
Start: 2024-09-07 | End: 2024-09-14

## 2024-09-07 RX ORDER — LANOLIN ALCOHOL/MO/W.PET/CERES
1 CREAM (GRAM) TOPICAL DAILY
COMMUNITY

## 2024-09-07 RX ORDER — FLUTICASONE PROPIONATE 50 MCG
SPRAY, SUSPENSION (ML) NASAL
COMMUNITY

## 2024-09-07 RX ORDER — AZELAIC ACID 0.15 G/G
GEL TOPICAL
COMMUNITY

## 2024-09-07 RX ORDER — DOXYCYCLINE HYCLATE 50 MG/1
CAPSULE, GELATIN COATED ORAL
COMMUNITY

## 2024-09-07 RX ORDER — PROPRANOLOL HYDROCHLORIDE 20 MG/1
1 TABLET ORAL 2 TIMES DAILY
COMMUNITY

## 2024-09-07 RX ORDER — ATORVASTATIN CALCIUM 10 MG/1
1 TABLET, FILM COATED ORAL DAILY
COMMUNITY

## 2024-09-09 NOTE — PROGRESS NOTES
Subjective      Manuel Lazcano is a 83 y.o. male who presents for forearm lump.    History of Present Illness    The patient, a frequent traveler between [place] and [place], presents with a painful, crusty skin lesion that has been present for a month. The lesion, which has not increased in size, is located in an unspecified area. The patient reports that the lesion is usually sensitive to touch, causing discomfort even with light contact such as catching on a sleeve. However, during the consultation, the patient noted that the lesion was not painful when manipulated by the provider.    The patient has a history of skin cancer. The patient has been on blood thinners, specifically Plavix, and has a known allergy to penicillin, which causes a severe rash.    He has had blood work recently.  He was able to pull this up on his phone in his patient portal and creatinine was 1.1 within the last 6 months.       The following have been reviewed and updated as appropriate in this visit:   Allergies  Meds  Problems  Med Hx  Surg Hx  Fam Hx         Allergies   Allergen Reactions   • Penicillins Hives     HIVES     Current Outpatient Medications   Medication Sig Dispense Refill   • doxycycline (VIBRAMYCIN) 50 mg capsule Take one capsule by mouth once daily with food and water.     • atorvastatin (LIPITOR) 10 mg tablet Take 1 tablet (10 mg total) by mouth daily     • azelaic acid 15 % gel APPLY TO AFFECTED AREA TOPICALLY TWICE DAILY     • betamethasone, augmented, (DIPROLENE AF) 0.05 % ointment SMARTSIG:Topical     • cyanocobalamin 1,000 mcg tablet Take 1 tablet (1,000 mcg total) by mouth daily     • fluticasone propionate (FLONASE) 50 mcg/actuation nasal spray INSTILL 1 SPRAY INTO EACH NOSTRIL TWICE DAILY     • levothyroxine (SYNTHROID, LEVOTHROID) 50 mcg tablet TAKE 1 TABLET BY MOUTH IN THE MORNING ON AN EMPTY STOMACH ONCE DAILY     • propranoloL (INDERAL) 20 mg tablet Take 1 tablet (20 mg total) by mouth 2 (two)  times a day     • clopidogreL (PLAVIX) 75 mg tablet Take 1 tablet (75 mg total) by mouth daily     • montelukast (SINGULAIR) 10 mg tablet TAKE 1 TABLET BY MOUTH  DAILY 90 tablet 1   • zolpidem (AMBIEN) 10 mg tablet Take 1 tablet (10 mg total) by mouth nightly. 90 tablet 0   • probenecid (BENEMID) 500 mg tablet TAKE 1 TABLET BY MOUTH  DAILY 90 tablet 3   • lansoprazole (PREVACID) 30 mg capsule TAKE 1 CAPSULE BY MOUTH  TWICE DAILY 180 capsule 3   • tamsulosin (FLOMAX) 0.4 mg capsule TAKE 1 CAPSULE BY MOUTH  DAILY 90 capsule 3   • polyethylene glycol (MIRALAX) 17 gram packet Take 1 packet as needed by oral route.     • cholecalciferol, vitamin D3, (VITAMIN D3) 2,000 unit tablet 1 tab Daily po     • cephalexin 500 mg capsule Take 1 capsule (500 mg total) by mouth 3 (three) times a day for 7 days 21 capsule 0   • mometasone (NASONEX) 50 mcg/actuation nasal spray Use 2 sprays nasally twice a day 3 12   • ipratropium (ATROVENT) 0.06 % nasal spray  0     No current facility-administered medications for this visit.     Past Medical History:   Diagnosis Date   • Allergic    • GERD (gastroesophageal reflux disease)    • Gout    • Stroke (CMS/HCC)      Past Surgical History:   Procedure Laterality Date   • NOSE SURGERY     • TONSILLECTOMY       History reviewed. No pertinent family history.  Social History     Socioeconomic History   • Marital status:    Tobacco Use   • Smoking status: Never   • Smokeless tobacco: Never     Social Determinants of Health     Tobacco Use: Low Risk  (9/7/2024)    Patient History    • Smoking Tobacco Use: Never    • Smokeless Tobacco Use: Never       Review of Systems  Negative ROS except noted in HPI    Objective   /54 (BP Location: Right arm, Patient Position: Sitting, Cuff Size: Large Adult)   Pulse 61   Temp 36.5 °C (97.7 °F) (Temporal)   Resp 16   Wt 79.4 kg (175 lb)   SpO2 96%   BMI 27.40 kg/m²     Physical Exam  Vitals and nursing note reviewed.   Constitutional:        Appearance: Normal appearance.   Cardiovascular:      Rate and Rhythm: Normal rate and regular rhythm.      Heart sounds: Normal heart sounds.   Pulmonary:      Effort: Pulmonary effort is normal.      Breath sounds: Normal breath sounds.   Skin:     Comments: Dorsum of left forearm has 0.5 cm raised firm nodule with mild erythematous base.  There is either scant drainage or skin crusting on the top of the nodule.  The nodule is tender.   Neurological:      Mental Status: He is alert and oriented to person, place, and time.   Psychiatric:         Mood and Affect: Mood normal.         Behavior: Behavior normal.         Assessment/Plan     Skin Lesion  Possible infection due to surrounding redness and tenderness.  Possible skin abscess/cyst or skin cancer.  -Attempted drainage in office.  With verbal consent, the area was cleansed with alcohol and free spray was used to briefly numb the area.  21-gauge needle-gauge inserted into the base of the nodule and bloody drainage was expressed bloody drainage was expressed.  As there was no purulent drainage that readily expelled, no further I&D was done.  -Plan to refer to dermatology for biopsy.  -Prescribe Cephalexin for erythema and tenderness.  -He is moving back to [place] and 2 to 3 weeks and has a dermatologist that is able to get him in within a few days.  -If the lesion gets bigger or more painful or has purulent drainage, return for I&D            Diagnosis Plan   1. Skin lesion  cephalexin 500 mg capsule            Britni White, CNP